# Patient Record
Sex: FEMALE | Race: OTHER | Employment: UNEMPLOYED | URBAN - METROPOLITAN AREA
[De-identification: names, ages, dates, MRNs, and addresses within clinical notes are randomized per-mention and may not be internally consistent; named-entity substitution may affect disease eponyms.]

---

## 2017-05-23 ENCOUNTER — TELEPHONE (OUTPATIENT)
Dept: OBGYN CLINIC | Facility: CLINIC | Age: 37
End: 2017-05-23

## 2017-05-25 ENCOUNTER — TELEPHONE (OUTPATIENT)
Dept: OBGYN CLINIC | Facility: CLINIC | Age: 37
End: 2017-05-25

## 2017-05-25 ENCOUNTER — APPOINTMENT (OUTPATIENT)
Dept: LAB | Age: 37
End: 2017-05-25
Attending: OBSTETRICS & GYNECOLOGY
Payer: COMMERCIAL

## 2017-05-25 ENCOUNTER — OFFICE VISIT (OUTPATIENT)
Dept: OBGYN CLINIC | Facility: CLINIC | Age: 37
End: 2017-05-25

## 2017-05-25 VITALS
WEIGHT: 163.81 LBS | DIASTOLIC BLOOD PRESSURE: 68 MMHG | SYSTOLIC BLOOD PRESSURE: 103 MMHG | HEART RATE: 75 BPM | BODY MASS INDEX: 26 KG/M2

## 2017-05-25 DIAGNOSIS — R30.0 DYSURIA: ICD-10-CM

## 2017-05-25 DIAGNOSIS — R10.2 PELVIC PAIN IN FEMALE: ICD-10-CM

## 2017-05-25 DIAGNOSIS — R10.2 PELVIC PAIN IN FEMALE: Primary | ICD-10-CM

## 2017-05-25 PROCEDURE — 99213 OFFICE O/P EST LOW 20 MIN: CPT | Performed by: OBSTETRICS & GYNECOLOGY

## 2017-05-25 PROCEDURE — 81001 URINALYSIS AUTO W/SCOPE: CPT

## 2017-05-25 RX ORDER — MELATONIN
325
COMMUNITY
End: 2017-10-13

## 2017-05-25 RX ORDER — NITROFURANTOIN 25; 75 MG/1; MG/1
100 CAPSULE ORAL 2 TIMES DAILY
Qty: 14 CAPSULE | Refills: 0 | Status: SHIPPED | OUTPATIENT
Start: 2017-05-25 | End: 2017-06-01

## 2017-05-25 RX ORDER — ERGOCALCIFEROL 1.25 MG/1
CAPSULE ORAL
COMMUNITY
End: 2017-09-11

## 2017-05-25 NOTE — PROGRESS NOTES
HPI:    Patient ID: Rosas Reynolds is a 40year old female. HPI   with reg menses and no BC. They are not using BC but are not planning timed IC either. She had menses 1 week late in April and then 1 week early on May 13th.  She had onset mild supra indication for imaging at this time. We also discussed fertility and reviewed the genetics testing and consult from [facility]- Dr Deion Dillon. 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  I did le

## 2017-05-25 NOTE — TELEPHONE ENCOUNTER
Can RN check for UA result later today and send to Dr on call? It's not back by time I'm leaving here. Thanks.

## 2017-05-25 NOTE — TELEPHONE ENCOUNTER
Please send Macrobid 100mg BID for 7 day course.  14 tabs and no refills for presumed UTI based on UA

## 2017-07-24 ENCOUNTER — TELEPHONE (OUTPATIENT)
Dept: OBGYN CLINIC | Facility: CLINIC | Age: 37
End: 2017-07-24

## 2017-07-24 NOTE — TELEPHONE ENCOUNTER
Pt is having some fatigue and nausea & is calling back about mesg below. Also questions about pn vititams no dha in them. [phone].

## 2017-07-24 NOTE — TELEPHONE ENCOUNTER
Pt had a home positive. It was positive 7/23/17. LMP 6/15/17, exact date. Instructed pt to start a PNV with DHA. Pt agrees to see all OB MDs during her pregnancy. Informed pt to call with any spotting/bleeding or problems.   Pt scheduled for an OBN vis

## 2017-07-25 NOTE — TELEPHONE ENCOUNTER
C/O PT HAS BEEN MIGUEL IRON SUPPLEMENT FOR 6 MONTHS. STATES FERRITIN LEVEL IS LOW. HAD BEEN AS LOW AS 10 IN THE WINTER. LAST DRAW A MONTH AGO IS 31 BUT STATES IT SHOULD BE 70.  PT TAKES VITRON-C (OTC) IRON SUPPLEMENT WITH VIT C.   C/O FATIGUE AND SOB BECAUS

## 2017-07-26 ENCOUNTER — TELEPHONE (OUTPATIENT)
Dept: OBGYN CLINIC | Facility: CLINIC | Age: 37
End: 2017-07-26

## 2017-07-26 NOTE — TELEPHONE ENCOUNTER
Would like to know what she can take for a headache, how much and what can she take. Feels like shes coming down with a cold. Okay to leave detailed vm as to what to take and how much.

## 2017-07-26 NOTE — TELEPHONE ENCOUNTER
Pt states that she feels like she is getting a cold. Pt states that she thinks that she has a fever. Informed pt that if she has a fever or congestion, yellow/green mucous, she needs to see her pcp.  Informed pt for OTC meds it is not rec to have OTC meds

## 2017-07-28 ENCOUNTER — TELEPHONE (OUTPATIENT)
Dept: OBGYN CLINIC | Facility: CLINIC | Age: 37
End: 2017-07-28

## 2017-07-28 DIAGNOSIS — O20.9 BLEEDING IN EARLY PREGNANCY: Primary | ICD-10-CM

## 2017-07-28 NOTE — TELEPHONE ENCOUNTER
Pt is coming in on 8/9 for OBN. But did have positive pregnancy test. Is bleeding bright red right now. Unsure what she should do.

## 2017-07-28 NOTE — TELEPHONE ENCOUNTER
Pt about 6 weeks pregnant based off LMP of 6/15/17 and calling to report vaginal bleeding. Pt stated that the bleeding is bright red in color and started this morning.  Pt stated that she noticed \"blood clots coming out\" when she went to the bathroom this

## 2017-07-28 NOTE — TELEPHONE ENCOUNTER
Reviewed message with Harjinder Don on call for clarification. Harjinder Don stated that pt needs a blood type and quants x2---NOT type and screen. Orders placed and pt informed of recs. Pt stated she will go to lab tomorrow and repeat in 48 hours & call for results.  Pt instr

## 2017-07-29 ENCOUNTER — APPOINTMENT (OUTPATIENT)
Dept: LAB | Facility: HOSPITAL | Age: 37
End: 2017-07-29
Attending: OBSTETRICS & GYNECOLOGY
Payer: COMMERCIAL

## 2017-07-29 DIAGNOSIS — O20.9 BLEEDING IN EARLY PREGNANCY: ICD-10-CM

## 2017-07-29 LAB
B-HCG SERPL-ACNC: 9227 MIU/ML
RH BLOOD TYPE: POSITIVE

## 2017-07-29 PROCEDURE — 86901 BLOOD TYPING SEROLOGIC RH(D): CPT

## 2017-07-29 PROCEDURE — 84702 CHORIONIC GONADOTROPIN TEST: CPT

## 2017-07-29 PROCEDURE — 36415 COLL VENOUS BLD VENIPUNCTURE: CPT

## 2017-07-29 PROCEDURE — 86900 BLOOD TYPING SEROLOGIC ABO: CPT

## 2017-07-31 ENCOUNTER — TELEPHONE (OUTPATIENT)
Dept: PEDIATRICS CLINIC | Facility: CLINIC | Age: 37
End: 2017-07-31

## 2017-07-31 DIAGNOSIS — O20.0 THREATENED ABORTION: Primary | ICD-10-CM

## 2017-07-31 NOTE — TELEPHONE ENCOUNTER
Pt calling that she she has mild cramping. Pt states that on Friday she stared with red bleeding like a period with wiping and spotting. Pt states that Saturday she soaked a pad 8-11 am (an overnight pad.), then the rest of the day no bleeding.   Sunday sh

## 2017-07-31 NOTE — TELEPHONE ENCOUNTER
Pt states she just found out she is pregnant. Pt states she was bleeding on 7/28 and now pt is cramping and has high temperature. Wants to know what should she do next.

## 2017-07-31 NOTE — TELEPHONE ENCOUNTER
Informed pt that I discussed with JEANNETTE and he wanted her to have an OB US and go for a progesterone level. Gave pt the phone number to schedule the OB U/S and informed pt that she would go to the lab to have the progesterone level.  Pt informed of hcg level

## 2017-08-01 ENCOUNTER — HOSPITAL ENCOUNTER (OUTPATIENT)
Dept: ULTRASOUND IMAGING | Facility: HOSPITAL | Age: 37
Discharge: HOME | End: 2017-08-01
Attending: OBSTETRICS & GYNECOLOGY
Payer: COMMERCIAL

## 2017-08-01 DIAGNOSIS — O20.0 THREATENED ABORTION: ICD-10-CM

## 2017-08-01 PROCEDURE — 76817 TRANSVAGINAL US OBSTETRIC: CPT | Performed by: OBSTETRICS & GYNECOLOGY

## 2017-08-01 PROCEDURE — 76801 OB US < 14 WKS SINGLE FETUS: CPT | Performed by: OBSTETRICS & GYNECOLOGY

## 2017-08-01 NOTE — TELEPHONE ENCOUNTER
Pt states that she went for US today and is requesting results. Pt is just leaving appt now. Pt advised that when radiologist dictates report, we will contact her with results.  Pt advised we can check to see if it has been resulted before the office closes

## 2017-08-01 NOTE — TELEPHONE ENCOUNTER
Per NJG pt to repeat US in about 10 days. Pt also advised to repeat HCG and have progesterone level drawn. Pt informed that US shows possible early sax, will need HCG results and repeat US to see if pregnancy is progressing as expected.  Pt informed that if

## 2017-08-01 NOTE — TELEPHONE ENCOUNTER
Per pt her reports have to be sent over, no report is showing in system, Pt would like to know who reads this the radiology dept or lab? Pt is req  Nurse call back would like to dicuss info.   #  [phone]

## 2017-08-02 ENCOUNTER — APPOINTMENT (OUTPATIENT)
Dept: LAB | Facility: HOSPITAL | Age: 37
End: 2017-08-02
Attending: OBSTETRICS & GYNECOLOGY
Payer: COMMERCIAL

## 2017-08-02 ENCOUNTER — TELEPHONE (OUTPATIENT)
Dept: OBGYN CLINIC | Facility: CLINIC | Age: 37
End: 2017-08-02

## 2017-08-02 DIAGNOSIS — O20.0 THREATENED ABORTION, ANTEPARTUM: Primary | ICD-10-CM

## 2017-08-02 DIAGNOSIS — O20.0 THREATENED ABORTION: ICD-10-CM

## 2017-08-02 DIAGNOSIS — O20.0 THREATENED ABORTION, ANTEPARTUM: ICD-10-CM

## 2017-08-02 LAB
B-HCG SERPL-ACNC: NORMAL MIU/ML
PROGEST SERPL-MCNC: 10.5 NG/ML

## 2017-08-02 PROCEDURE — 36415 COLL VENOUS BLD VENIPUNCTURE: CPT

## 2017-08-02 PROCEDURE — 84702 CHORIONIC GONADOTROPIN TEST: CPT

## 2017-08-02 PROCEDURE — 84144 ASSAY OF PROGESTERONE: CPT

## 2017-08-02 NOTE — TELEPHONE ENCOUNTER
I spoke with Jaime Arredondo concerning results from last evening. She actually drove home last evening and forgot to do the blood draw for second HCG and Prog. She is coming today. I'll check tonight and again in AM for results.  We most likely will want next US 10-1

## 2017-08-03 ENCOUNTER — TELEPHONE (OUTPATIENT)
Dept: PEDIATRICS CLINIC | Facility: CLINIC | Age: 37
End: 2017-08-03

## 2017-08-03 NOTE — TELEPHONE ENCOUNTER
HCG noted at 14,333. No answer and I left message with result. Still waiting on Prog level. I want her to schedule repeat US 10-11 days from yesterday's [address].

## 2017-08-03 NOTE — TELEPHONE ENCOUNTER
Pt is calling has some question on her labs .  Pt was given results today but has some more questions ,

## 2017-08-03 NOTE — TELEPHONE ENCOUNTER
I discussed Prog level = 10.6. Level > 10 is OK for spontaneous pregnancy. She will schedule the second US as instructed 08-11 or 08-12. I will be out of town and I asked her to call back on next office day for result.  She does have the known miscarriage r

## 2017-08-03 NOTE — TELEPHONE ENCOUNTER
Pt has more questions for JEANNETTE and would like a call back from JEANNETTE. Pt asking if 10.5 progesterone level is normal. Re-read note from JEANNETTE stating that \"level >10 is OK for spontaneous pregnancy\".  Pt stated that she read online that \"cramping and bleeding

## 2017-08-04 NOTE — TELEPHONE ENCOUNTER
I spoke with Dustin Santiago and discussed normal Prog level. She does not have urinary complaints suspicious for UTI. Frequency is normal in pregnancy. Her bleeding is old blood. I gave instructions. She has US scheduled Friday next week.

## 2017-08-11 ENCOUNTER — HOSPITAL ENCOUNTER (OUTPATIENT)
Dept: ULTRASOUND IMAGING | Facility: HOSPITAL | Age: 37
Discharge: HOME | End: 2017-08-11
Attending: OBSTETRICS & GYNECOLOGY
Payer: COMMERCIAL

## 2017-08-11 DIAGNOSIS — O20.0 THREATENED ABORTION: ICD-10-CM

## 2017-08-11 PROCEDURE — 76801 OB US < 14 WKS SINGLE FETUS: CPT | Performed by: OBSTETRICS & GYNECOLOGY

## 2017-08-11 PROCEDURE — 76817 TRANSVAGINAL US OBSTETRIC: CPT | Performed by: OBSTETRICS & GYNECOLOGY

## 2017-08-14 ENCOUNTER — TELEPHONE (OUTPATIENT)
Dept: OBGYN CLINIC | Facility: CLINIC | Age: 37
End: 2017-08-14

## 2017-08-14 NOTE — TELEPHONE ENCOUNTER
Pt. Would like to get the results of her Ultrasound results from 8/11. Pt. Would like for the Dr to call her back.

## 2017-08-14 NOTE — TELEPHONE ENCOUNTER
Returned phone call to review US results. Pt states she wants to know if JEANNETTE is in today because she was wanting to talk to him and expecting a phone call from him.  Let patient know that he was out of the office and that I am on call and returning her phon

## 2017-08-14 NOTE — TELEPHONE ENCOUNTER
Pt. wants to schedule PN visit, but pt. has not had a Nurse Ed appt. ? Pt. States that she had to get an Ultrasound test, and was told to schedule a PN visit? Please advise, no Nurse Ed appt. ?

## 2017-08-14 NOTE — TELEPHONE ENCOUNTER
PT MISCARRIED A YEAR AGO BUT DID NOT HAVE OBN APPT THEN. PT WAS HOPING FOR OBN APPT THIS WEEK BUT COULD NOT ACCEPT THE OPENINGS WE HAVE.   STATES SHE HAS A VERY DEFINITE TIME FRAME WHEN SHE IS NOT SICK SO DOES NOT WANT AN APPT BEFORE 2PM.  THERE IS NO APPT

## 2017-08-14 NOTE — TELEPHONE ENCOUNTER
Pt only wants to speak with an MD. Routed to [facility] on call in JEANNETTE's absence to please call pt to discuss US results. Thanks.

## 2017-08-17 ENCOUNTER — TELEPHONE (OUTPATIENT)
Dept: OBGYN CLINIC | Facility: CLINIC | Age: 37
End: 2017-08-17

## 2017-08-17 RX ORDER — DOXYLAMINE SUCCINATE AND PYRIDOXINE HYDROCHLORIDE, DELAYED RELEASE TABLETS 10 MG/10 MG 10; 10 MG/1; MG/1
2 TABLET, DELAYED RELEASE ORAL NIGHTLY
Qty: 120 TABLET | Refills: 0 | Status: SHIPPED | OUTPATIENT
Start: 2017-08-17 | End: 2017-10-14

## 2017-08-17 NOTE — TELEPHONE ENCOUNTER
Pt c/o nausea and vomiting. Pt states that she can't eat anything, has no appetite. Pt states that she vomited this morning. This week pt states symptoms have gotten worse. Pt typically vomits once daily. Pt states that she can tolerate water.  Pt states th

## 2017-08-17 NOTE — TELEPHONE ENCOUNTER
Discussed with JEANNETTE on call. Pt to try diclegis. Pt given instructions on use. Pt advised it may cause drowsiness. Pt to call with any further questions or concerns. Pt verbalizes understanding.

## 2017-08-21 ENCOUNTER — TELEPHONE (OUTPATIENT)
Dept: OBGYN CLINIC | Facility: CLINIC | Age: 37
End: 2017-08-21

## 2017-08-21 NOTE — TELEPHONE ENCOUNTER
Pt did not show up for her OBN appt at 1pm today. I called pt. States she was too sick to come to appt. Pt is c/o nausea and vomiting. Pt was given diclegis by JEANNETTE on 8/17.   Pt states she is taking 2 pills at night and it helped for the first 2 days, b

## 2017-08-29 ENCOUNTER — TELEPHONE (OUTPATIENT)
Dept: OBGYN CLINIC | Facility: CLINIC | Age: 37
End: 2017-08-29

## 2017-08-29 ENCOUNTER — NURSE ONLY (OUTPATIENT)
Dept: OBGYN CLINIC | Facility: CLINIC | Age: 37
End: 2017-08-29

## 2017-08-29 DIAGNOSIS — Z34.91 ENCOUNTER FOR SUPERVISION OF NORMAL PREGNANCY IN FIRST TRIMESTER, UNSPECIFIED GRAVIDITY: Primary | ICD-10-CM

## 2017-08-29 DIAGNOSIS — Z34.01 ENCOUNTER FOR SUPERVISION OF NORMAL FIRST PREGNANCY IN FIRST TRIMESTER: Primary | ICD-10-CM

## 2017-08-29 RX ORDER — CHOLECALCIFEROL (VITAMIN D3) 25 MCG
1 TABLET,CHEWABLE ORAL DAILY
COMMUNITY
End: 2018-02-15

## 2017-08-29 NOTE — TELEPHONE ENCOUNTER
Pt seen for OBN and would like FTS. Pt lives in [place] and would prefer to see an MFM practice closer to home. [facility] or [facility] if possible.

## 2017-08-29 NOTE — PROGRESS NOTES
Pt seen for OBN appt today c/o nausea. Pt states she takes Diclegis nightly and it helps. Pt reports occasional vomiting but it otherwise able to tolerate food and fluid intake is sufficient.  Pt states she just a had labs drawn at her PCP's office 2 weeks and or partner Yes [facility] March 2017 with    Pets Yes Cat        GENETICS SCREENING    Patient greater than 35 Yes    Canavan Disease  No    Cystic Fibrosis No    Down Syndrome No    Hemophilia No    Masterson's Chorea No    Mental Retardation/Autis

## 2017-08-29 NOTE — TELEPHONE ENCOUNTER
Pt seen today for OBN. Pt asking if it's safe to have laser hair removal on her face during pregnancy. Pt states she noticed a lot of new hair since becoming pregnant. Informed her this would be sent to JEANNETTE on call and we will call her with his recs.

## 2017-08-30 ENCOUNTER — TELEPHONE (OUTPATIENT)
Dept: OBGYN CLINIC | Facility: CLINIC | Age: 37
End: 2017-08-30

## 2017-08-30 DIAGNOSIS — Z3A.09 9 WEEKS GESTATION OF PREGNANCY: Primary | ICD-10-CM

## 2017-08-30 LAB
HCT: 39.3
HEMATOCRIT: 39.3 %
HEMATOCRIT: 39.3 %
HEMOGLOBIN: 13.6 G/DL (ref 12–15)
HGB: 13.6 G/DL
HGB: 13.6 G/DL
MCV: 87 FL
PLATELETS: 279 X10E3/UL
T3 FREE: 2.8
T4, FREE: 0.9
TSH: 1.75 UIU/ML
WBC: 12.1 /HPF

## 2017-08-30 NOTE — TELEPHONE ENCOUNTER
Pt is 9w5d and requesting calcium supplements. Pt states she had OBN yesterday and the nurse discussed the recommended calcium during pregnancy and pt does not think she is consuming enough calcium.  Pt has PN appt scheduled with JEANNETTE next week on 9/7/17 and

## 2017-08-30 NOTE — TELEPHONE ENCOUNTER
Patient's info route to [facility]'s Dr. Maxi Tafoya to [phone]. Patient informed and given scheduling info [phone].

## 2017-08-30 NOTE — TELEPHONE ENCOUNTER
RECEIVED CBC, CMP TSH, T3, T4, UA AND URINE CULTURE FROM PT'S PCP, DR. Gerald Brunner FROM [address]. LABS ENTERED IN RESULTS CONSOLE. ORDER FOR REMAINING NPN LABS PLACED.

## 2017-09-02 ENCOUNTER — TELEPHONE (OUTPATIENT)
Dept: PEDIATRICS CLINIC | Facility: CLINIC | Age: 37
End: 2017-09-02

## 2017-09-02 RX ORDER — ONDANSETRON 4 MG/1
4 TABLET, FILM COATED ORAL EVERY 8 HOURS PRN
Qty: 30 TABLET | Refills: 1 | Status: SHIPPED | OUTPATIENT
Start: 2017-09-02 | End: 2017-09-23

## 2017-09-02 NOTE — TELEPHONE ENCOUNTER
Pt states the nausea meds prescribed are not helping. States they have made her throat up. Wants to know if there Is something else that can be prescribed .

## 2017-09-02 NOTE — TELEPHONE ENCOUNTER
Pt was given diclegis and taking the meds have made her throw up. Pt states she is nauseous all day. Pt states she can't even go into the kitchen because of the smells.   Pt states nausea is getting worse, but she is able to keep a little food and drink d

## 2017-09-02 NOTE — TELEPHONE ENCOUNTER
Per JEANNETTE on call, give pt rx for zofran 4mg, take one tab by mouth every 8 hours prn, #30 with 1 refill. Pt informed. Pt states she read that a side effect of zofran is nausea. Pt reassured that this is a med to help with nausea. Erx sent.

## 2017-09-07 ENCOUNTER — TELEPHONE (OUTPATIENT)
Dept: OBGYN CLINIC | Facility: CLINIC | Age: 37
End: 2017-09-07

## 2017-09-07 ENCOUNTER — INITIAL PRENATAL (OUTPATIENT)
Dept: OBGYN CLINIC | Facility: CLINIC | Age: 37
End: 2017-09-07

## 2017-09-07 ENCOUNTER — APPOINTMENT (OUTPATIENT)
Dept: LAB | Age: 37
End: 2017-09-07
Attending: OBSTETRICS & GYNECOLOGY
Payer: COMMERCIAL

## 2017-09-07 VITALS
DIASTOLIC BLOOD PRESSURE: 68 MMHG | BODY MASS INDEX: 24 KG/M2 | HEART RATE: 79 BPM | WEIGHT: 156 LBS | SYSTOLIC BLOOD PRESSURE: 102 MMHG

## 2017-09-07 DIAGNOSIS — Z34.01 ENCOUNTER FOR SUPERVISION OF NORMAL FIRST PREGNANCY IN FIRST TRIMESTER: Primary | ICD-10-CM

## 2017-09-07 DIAGNOSIS — Z3A.09 9 WEEKS GESTATION OF PREGNANCY: ICD-10-CM

## 2017-09-07 PROBLEM — O09.519 AMA (ADVANCED MATERNAL AGE) PRIMIGRAVIDA 35+: Status: ACTIVE | Noted: 2017-09-07

## 2017-09-07 PROBLEM — Q95.1: Status: ACTIVE | Noted: 2017-09-07

## 2017-09-07 LAB
ANTIBODY SCREEN: NEGATIVE
APPEARANCE: CLEAR
GLUCOSE (URINE DIPSTICK): 100 MG/DL
MULTISTIX LOT#: NORMAL NUMERIC
RH BLOOD TYPE: POSITIVE
RUBV IGG SER-ACNC: 103.1 IU/ML
URINE-COLOR: YELLOW

## 2017-09-07 PROCEDURE — 86900 BLOOD TYPING SEROLOGIC ABO: CPT

## 2017-09-07 PROCEDURE — 86901 BLOOD TYPING SEROLOGIC RH(D): CPT

## 2017-09-07 PROCEDURE — 86780 TREPONEMA PALLIDUM: CPT

## 2017-09-07 PROCEDURE — 86850 RBC ANTIBODY SCREEN: CPT

## 2017-09-07 PROCEDURE — 87389 HIV-1 AG W/HIV-1&-2 AB AG IA: CPT

## 2017-09-07 PROCEDURE — 87340 HEPATITIS B SURFACE AG IA: CPT

## 2017-09-07 PROCEDURE — 36415 COLL VENOUS BLD VENIPUNCTURE: CPT

## 2017-09-07 PROCEDURE — 86762 RUBELLA ANTIBODY: CPT

## 2017-09-07 NOTE — PROGRESS NOTES
We discussed AMA as well as her known chromosomal inversion. She will see Dr Hans Keys at [facility] [place] due to location and she also had genetics consult there in November. Daily nausea and emesis with fatigue on meds. Using Diclegis with help.  She asked several time

## 2017-09-07 NOTE — TELEPHONE ENCOUNTER
I discussed the previous message with the patient but please reinforce that she needs to have appointment before 13 6/7 weeks. She's 10 6/7 today. Her serology labs were just drawn today at [facility] and may not be resulted until Saturday.

## 2017-09-07 NOTE — TELEPHONE ENCOUNTER
I have completed the prenatal exam, problem list and info on her prenatal chart. She needs to see the [facility] group for consult and first trimester screen due to Lanney Maher and Chromosome inversion. Looks like the referral is already done.   Please se

## 2017-09-08 LAB
C TRACH DNA SPEC QL NAA+PROBE: NEGATIVE
HBV SURFACE AG SERPL QL IA: NONREACTIVE
HIV1+2 AB SERPL QL IA: NONREACTIVE
HPV I/H RISK 1 DNA SPEC QL NAA+PROBE: NEGATIVE
N GONORRHOEA DNA SPEC QL NAA+PROBE: NEGATIVE
T PALLIDUM AB SER QL: NEGATIVE
T VAGINALIS RRNA SPEC QL NAA+PROBE: NEGATIVE

## 2017-09-11 ENCOUNTER — TELEPHONE (OUTPATIENT)
Dept: OBGYN CLINIC | Facility: CLINIC | Age: 37
End: 2017-09-11

## 2017-09-11 ENCOUNTER — OFFICE VISIT (OUTPATIENT)
Dept: OBGYN CLINIC | Facility: CLINIC | Age: 37
End: 2017-09-11

## 2017-09-11 VITALS — BODY MASS INDEX: 23 KG/M2 | WEIGHT: 149 LBS

## 2017-09-11 DIAGNOSIS — O20.0 THREATENED ABORTION, ANTEPARTUM: Primary | ICD-10-CM

## 2017-09-11 LAB — LAST PAP RESULT: NORMAL

## 2017-09-11 PROCEDURE — 99213 OFFICE O/P EST LOW 20 MIN: CPT | Performed by: OBSTETRICS & GYNECOLOGY

## 2017-09-11 NOTE — TELEPHONE ENCOUNTER
Informed of below recs. \[facility]\"" already spoke with Michelle this AM and was told they will call her back after receiving paperwork. Pt also wondering about Zantac for acid reflux that was advised by JEANNETTE at PN today. \[facility]\"" was advised to take twice a day.

## 2017-09-11 NOTE — TELEPHONE ENCOUNTER
I know patient has referral generated for the FTS at [facility] but we need to send updated copy of prenatal since all of her labs are back from 09-07 draw. Thanks.

## 2017-09-11 NOTE — TELEPHONE ENCOUNTER
Pt 11w3d calling to report a \"sour stomach\" and vomiting over the weekend. Pt also calling to report vaginal bleeding last night. Pt stated that since Friday she has thrown up daily and \"hasnt been eating a proper dinner\".  Pt stated that last night for

## 2017-09-11 NOTE — TELEPHONE ENCOUNTER
LMTCB. REQUEST AND PN INFO HAVE BEEN FAXED TO [facility]. PT NEEDS TO CALL THEM [phone].

## 2017-09-11 NOTE — PROGRESS NOTES
Problem Visit: Recurrent bleeding last evening and questionable passing tissue. Spec shows small amount of dark red blood and no active bleeding. Bimanual gives os closed / thick / firm and uterus non-tender. Office US shows viable IUP with FM and FHT's.  R

## 2017-09-12 NOTE — TELEPHONE ENCOUNTER
Make sure taking diclegis correctly -- 2 at bedtime then add one in morning if needed & add another in after noon if still needed.  Yes can take zantac & diclegis at same time

## 2017-09-12 NOTE — TELEPHONE ENCOUNTER
REVIEWED INSTRUCTIONS FOR DICLEGIS AND ASSURED HER SHE CAN TAKE THAT ALONG WITH THE ZANTAC. SHE IS ABLE TO KEEP MOST FLUIDS DOWN BUT NOT EATING MUCH IN THE PAST 5-6 DAYS.   TALKED ABOUT REALLY BLAND FOODS SUCH AS PLAIN RICE AND PLAIN MASHED POTATOES, FALGUNI

## 2017-09-13 ENCOUNTER — TELEPHONE (OUTPATIENT)
Dept: OBGYN CLINIC | Facility: CLINIC | Age: 37
End: 2017-09-13

## 2017-09-13 NOTE — TELEPHONE ENCOUNTER
CONOR IS FROM [facility] AND WANTED TO KNOW IF THEY SHOULD GO FORWARD WITH THE APPT BECAUSE NOTES INDICATE PT WAS BLEEDING.   EXPLAINED THAT 9-11-17 APPT NOTES INDICATE PT HAD SOME BLEEDING BUT US IN OFFICE SHOWED VIABLE IUP SO SHE WILL GO FORWARD

## 2017-09-14 ENCOUNTER — TELEPHONE (OUTPATIENT)
Dept: OBGYN CLINIC | Facility: CLINIC | Age: 37
End: 2017-09-14

## 2017-09-14 NOTE — TELEPHONE ENCOUNTER
PT Amy 49 UNSURE WHAT THEY ARE SUPPOSED TO BE SCHEDULING. AFTER REVIEWING CHART THERE IS ANOTHER MESSAGE FROM 9-7-17, AS WELL AS PHONE ENCOUNTER FROM 9-11-17 FROM JEANNETTE INDICATING PT NEEDS FTS AND A CONSULT FOR CHROMOSOME INVERSION.    I

## 2017-09-18 ENCOUNTER — CHARTING TRANS (OUTPATIENT)
Dept: OTHER | Age: 37
End: 2017-09-18

## 2017-09-19 ENCOUNTER — TELEPHONE (OUTPATIENT)
Dept: OBGYN CLINIC | Facility: CLINIC | Age: 37
End: 2017-09-19

## 2017-09-19 NOTE — TELEPHONE ENCOUNTER
Received [facility] MF Letter dated 9/18/17 and placed on JEANNETTE's desk.

## 2017-09-19 NOTE — TELEPHONE ENCOUNTER
Received US report from [facility] dated 9/18/17. Placed in JEANNETTE orange folder; copy placed in nurses brown folder.   Thank you~

## 2017-09-21 NOTE — TELEPHONE ENCOUNTER
JEANNETTE signed on MFM letter from [address] dated 9/18/17. Sent to scanning.

## 2017-09-23 ENCOUNTER — TELEPHONE (OUTPATIENT)
Dept: OBGYN CLINIC | Facility: CLINIC | Age: 37
End: 2017-09-23

## 2017-09-23 RX ORDER — ONDANSETRON 4 MG/1
4 TABLET, ORALLY DISINTEGRATING ORAL EVERY 8 HOURS PRN
Qty: 30 TABLET | Refills: 1 | Status: SHIPPED | OUTPATIENT
Start: 2017-09-23 | End: 2018-02-15

## 2017-09-23 NOTE — TELEPHONE ENCOUNTER
Pt requesting that her Zofran 4 mg oral tab rx is changed to Zofran that dissolves in her mouth. Pt states she never used the Zofran because that was her last resort. Pt states she vomited the water that she was sipping.  Advised pt if she cannot tolerate s

## 2017-09-23 NOTE — TELEPHONE ENCOUNTER
13w1d c/o vomiting 4 times last night. States has been taking Diclegis 4 per day. States due to \"feeling better\" last time she took it was Wednesday. States Friday afternoon started vomiting and was not able to keep down any crackers or water.   States

## 2017-09-23 NOTE — TELEPHONE ENCOUNTER
Pt advised to take diclegis and to continue taking up to 4 times per day as directed. Pt is keeping water down now. Pt advised to focus on keeping fluids down now and later on she can try some foods.  Pt to go to nearest ER if unable to keep food or fluids

## 2017-09-28 ENCOUNTER — TELEPHONE (OUTPATIENT)
Dept: OBGYN CLINIC | Facility: CLINIC | Age: 37
End: 2017-09-28

## 2017-09-28 NOTE — TELEPHONE ENCOUNTER
Received lab report from [facility] dated 9/18/17. Placed in JEANNETTE orange folder; copy placed in nurses brown folder.   Thank you~

## 2017-10-02 ENCOUNTER — ROUTINE PRENATAL (OUTPATIENT)
Dept: OBGYN CLINIC | Facility: CLINIC | Age: 37
End: 2017-10-02

## 2017-10-02 VITALS
SYSTOLIC BLOOD PRESSURE: 103 MMHG | BODY MASS INDEX: 23 KG/M2 | DIASTOLIC BLOOD PRESSURE: 73 MMHG | WEIGHT: 149.38 LBS | HEART RATE: 85 BPM

## 2017-10-02 DIAGNOSIS — Z34.92 ENCOUNTER FOR SUPERVISION OF NORMAL PREGNANCY IN SECOND TRIMESTER, UNSPECIFIED GRAVIDITY: Primary | ICD-10-CM

## 2017-10-02 RX ORDER — ACETAMINOPHEN 500 MG
TABLET ORAL
Refills: 11 | COMMUNITY
Start: 2017-07-31 | End: 2017-10-13

## 2017-10-04 NOTE — PROGRESS NOTES
Negative Panorama with Etelvina Henley. Does not want to know gender. Daily nausea and emesis using Diclegis and rare Zofran. Discussed Level 2 f/u with Etelvina Henley M.

## 2017-10-06 ENCOUNTER — TELEPHONE (OUTPATIENT)
Dept: OBGYN CLINIC | Facility: CLINIC | Age: 37
End: 2017-10-06

## 2017-10-06 NOTE — TELEPHONE ENCOUNTER
Pt 15w0d calling to report that she experienced 3 episodes of \"abrupt vomiting\" last night.  Pt stated that she took Diclegis around 10:30pm and then started vomiting around 11pm. Pt stated that she had another episode of vomiting about 30 minutes later a

## 2017-10-13 ENCOUNTER — ROUTINE PRENATAL (OUTPATIENT)
Dept: OBGYN CLINIC | Facility: CLINIC | Age: 37
End: 2017-10-13

## 2017-10-13 ENCOUNTER — HOSPITAL ENCOUNTER (OUTPATIENT)
Facility: HOSPITAL | Age: 37
Setting detail: OBSERVATION
Discharge: HOME | End: 2017-10-13
Attending: OBSTETRICS & GYNECOLOGY | Admitting: OBSTETRICS & GYNECOLOGY
Payer: COMMERCIAL

## 2017-10-13 ENCOUNTER — TELEPHONE (OUTPATIENT)
Dept: OBGYN CLINIC | Facility: CLINIC | Age: 37
End: 2017-10-13

## 2017-10-13 ENCOUNTER — APPOINTMENT (OUTPATIENT)
Dept: PERINATAL CARE | Facility: HOSPITAL | Age: 37
End: 2017-10-13
Attending: OBSTETRICS & GYNECOLOGY
Payer: COMMERCIAL

## 2017-10-13 VITALS
BODY MASS INDEX: 23 KG/M2 | HEART RATE: 105 BPM | SYSTOLIC BLOOD PRESSURE: 148 MMHG | WEIGHT: 147 LBS | DIASTOLIC BLOOD PRESSURE: 76 MMHG

## 2017-10-13 VITALS — TEMPERATURE: 99 F | RESPIRATION RATE: 18 BRPM

## 2017-10-13 DIAGNOSIS — O09.512 ELDERLY PRIMIGRAVIDA IN SECOND TRIMESTER: Primary | ICD-10-CM

## 2017-10-13 DIAGNOSIS — O41.90X0: ICD-10-CM

## 2017-10-13 DIAGNOSIS — O46.90 VAGINAL BLEEDING IN PREGNANCY: ICD-10-CM

## 2017-10-13 DIAGNOSIS — Z34.92 ENCOUNTER FOR SUPERVISION OF NORMAL PREGNANCY IN SECOND TRIMESTER, UNSPECIFIED GRAVIDITY: Primary | ICD-10-CM

## 2017-10-13 PROCEDURE — 76805 OB US >/= 14 WKS SNGL FETUS: CPT | Performed by: OBSTETRICS & GYNECOLOGY

## 2017-10-13 PROCEDURE — 99212 OFFICE O/P EST SF 10 MIN: CPT | Performed by: OBSTETRICS & GYNECOLOGY

## 2017-10-13 PROCEDURE — 99213 OFFICE O/P EST LOW 20 MIN: CPT | Performed by: OBSTETRICS & GYNECOLOGY

## 2017-10-13 NOTE — PROGRESS NOTES
Problem visit--16 wks c/o gush of pink watery fluid this morning. States she felt a large gush that woke her up and then kept leaking when she sat on the toilet. This was before she had voided.  She notes brown discharge throughout the day since which has

## 2017-10-13 NOTE — CONSULTS
[facility] Consultation    Date of Admission:  10/13/2017  Date of Consult:  10/13/2017    Reason for Consult:   Suspected ruptured membranes in the second trimester.     History of Present Illness:  Benancio Lesch is Living: Not recorded      Other Relevant History:  Past Medical History:   Diagnosis Date   • Anemia    • Anxiety      Past Surgical History:  5/2012: OTHER SURGICAL HISTORY      Comment: rhinoplasty  2012: REPAIR OF NASAL SEPTUM  No family history on file interruption due to the very poor prognosis in the pregnancy. I explained that if she would like, expectant management, she will need to be vigilant in monitoring for s/sx of infection and agreeable to IOL if signs of infection develop.   I explained the s periventricular leukomalacia and other neurologic sequelae, infection (eg, sepsis, pneumonia, meningitis), and necrotizing enterocolitis. The rates of these morbidities vary with gestational age and are higher in the setting of chorioamnionitis.       PPROM consultation and coordination of care. Our discussion is summarized above. The approximate physician face-to-face time was 40 minutes. I discussed the patient with Daiana Fairbanks and Sharee Kumar.     Paras Membreno MD  10/13/2017  4:26 PM

## 2017-10-13 NOTE — TELEPHONE ENCOUNTER
PT REQUESTING AN REFILL ON DICIGIS 10-10 MGS / PT ALSO STATE SHE'S 16 WEEKS / PT STATE SHE HAD WATER COMING FORM HER VAGINAL AREA / FEELING A LITTLE PRESSURE ON HER VAGINAL AREA / PLS ADV

## 2017-10-13 NOTE — TELEPHONE ENCOUNTER
Pt is 16w0d, reports she felt her panties were \"soaked\" when she woke up this morning. Pt states it had a \"foul smell and was a little bloody\". Pt states she put on a panty liner at 6 am and it filled up with a watery pinkish fluid.  Pt denies abdominal

## 2017-10-13 NOTE — TRIAGE
[facility]      Triage Note    Harjeet Palomino Patient Status:  Observation    1980 MRN [id number]   Location [address]  Attending Cheyenne Dwyer MD   Hosp Day # 0 PCP Nahum Allen Para: G3 this was thought to have more to do with there was no fluid left in vagina. Pt has appt with Dr Villa Daniel on Monday and plan is to get his 2nd opinion and then plan next step.   Pt is aware that due to live fetus and no evidence of infection that we cannot t

## 2017-10-14 ENCOUNTER — TELEPHONE (OUTPATIENT)
Dept: OBGYN CLINIC | Facility: CLINIC | Age: 37
End: 2017-10-14

## 2017-10-14 PROBLEM — O42.912 PREMATURE RUPTURE OF MEMBRANES IN SECOND TRIMESTER: Status: ACTIVE | Noted: 2017-10-14

## 2017-10-14 RX ORDER — DOXYLAMINE SUCCINATE AND PYRIDOXINE HYDROCHLORIDE, DELAYED RELEASE TABLETS 10 MG/10 MG 10; 10 MG/1; MG/1
2 TABLET, DELAYED RELEASE ORAL NIGHTLY
Qty: 120 TABLET | Refills: 0 | Status: SHIPPED | OUTPATIENT
Start: 2017-10-14 | End: 2018-02-15

## 2017-10-14 NOTE — TELEPHONE ENCOUNTER
Paged on call with c/o continuing nausea / emesis. She is out of Diclegis. I refilled med. We discussed her SROM. She denies pain, further LOF, blood or fever. She has appt with Dr Shena Nj with [facility] at about 1100 on Monday 10-16.  Can I as

## 2017-10-14 NOTE — TELEPHONE ENCOUNTER
Pt sent to [facility] triage due to office exam suspicious for SROM at 16 weeks gestation. Pt seen by [facility] MFM and VERONIKA 1.5 suspicious for rupture but amnisure was negative- this was thought to have more to do with there was no fluid left in vagina.   Pt has appt w

## 2017-10-16 ENCOUNTER — CHARTING TRANS (OUTPATIENT)
Dept: OTHER | Age: 37
End: 2017-10-16

## 2017-10-16 ENCOUNTER — TELEPHONE (OUTPATIENT)
Dept: OBGYN CLINIC | Facility: CLINIC | Age: 37
End: 2017-10-16

## 2017-10-16 NOTE — TELEPHONE ENCOUNTER
US report and MFM consult faxed to Dr Greg Andujar at [facility] #[phone] per JEANNETTE's request below.

## 2017-10-16 NOTE — TELEPHONE ENCOUNTER
6wks-hasnt had a bowel movement in 3 days, would like to know if colace is okay to take (stool softner)

## 2017-10-17 ENCOUNTER — OFFICE VISIT (OUTPATIENT)
Dept: OBGYN CLINIC | Facility: CLINIC | Age: 37
End: 2017-10-17

## 2017-10-17 VITALS
HEART RATE: 85 BPM | WEIGHT: 149 LBS | BODY MASS INDEX: 23 KG/M2 | SYSTOLIC BLOOD PRESSURE: 111 MMHG | DIASTOLIC BLOOD PRESSURE: 77 MMHG

## 2017-10-17 DIAGNOSIS — N89.8 VAGINAL DISCHARGE: Primary | ICD-10-CM

## 2017-10-17 PROCEDURE — 99212 OFFICE O/P EST SF 10 MIN: CPT | Performed by: OBSTETRICS & GYNECOLOGY

## 2017-10-17 NOTE — PROGRESS NOTES
Bobbi Madera is a 40year old female [id number] Patient's last menstrual period was 06/15/2017. Patient presents with:  Gyn Problem: vaginal itching/discharge in early pregnancy    Pt is 16 4/7 wk IUP. Was diagnosed with PPROM on 10/13/17.   Seen MFM at [facility]

## 2017-10-18 ENCOUNTER — TELEPHONE (OUTPATIENT)
Dept: OBGYN CLINIC | Facility: CLINIC | Age: 37
End: 2017-10-18

## 2017-10-18 NOTE — TELEPHONE ENCOUNTER
Received report from [facility] dated 10/16/17. Placed in JEANNETTE orange folder; copy placed in nurses brown folder.   Thank you~

## 2017-10-20 ENCOUNTER — TELEPHONE (OUTPATIENT)
Dept: OBGYN CLINIC | Facility: CLINIC | Age: 37
End: 2017-10-20

## 2017-10-20 ENCOUNTER — CHARTING TRANS (OUTPATIENT)
Dept: OTHER | Age: 37
End: 2017-10-20

## 2017-10-20 NOTE — TELEPHONE ENCOUNTER
Pt is 17wk PPROM reporting \"dampness in her panties\" states it's not constant, it comes and goes and is clear and odorless. Informed pt that since there is a tear in her water bag this is likely amniotic fluid.  Her recent vaginal culture was normal. Lizet Childers

## 2017-10-20 NOTE — TELEPHONE ENCOUNTER
Pt calling for vaginal culture results. Results negative, pt states she still has a lot of liquidy discharge. (Pt is PPROM at 17 weeks). Pt states she has no further questions.

## 2017-10-23 ENCOUNTER — HOSPITAL (OUTPATIENT)
Dept: OTHER | Age: 37
End: 2017-10-23
Attending: OBSTETRICS & GYNECOLOGY

## 2017-10-23 LAB
AMORPH SED URNS QL MICRO: ABNORMAL
AMPHETAMINES UR QL SCN>500 NG/ML: NEGATIVE
ANALYZER ANC (IANC): ABNORMAL
APPEARANCE UR: CLEAR
BARBITURATES UR QL SCN>200 NG/ML: NEGATIVE
BASOPHILS # BLD: 0 THOUSAND/MCL (ref 0–0.3)
BASOPHILS NFR BLD: 0 %
BENZODIAZ UR QL SCN>200 NG/ML: NEGATIVE
BILIRUB UR QL: NEGATIVE
BZE UR QL SCN>150 NG/ML: NEGATIVE
CANNABINOIDS UR QL SCN>50 NG/ML: NEGATIVE
CAOX CRY URNS QL MICRO: ABNORMAL
COLOR UR: COLORLESS
CRP SERPL-MCNC: 0.5 MG/DL
DIFFERENTIAL METHOD BLD: ABNORMAL
EOSINOPHIL # BLD: 0.2 THOUSAND/MCL (ref 0.1–0.5)
EOSINOPHIL NFR BLD: 2 %
EPITH CASTS #/AREA URNS LPF: ABNORMAL /[LPF]
ERYTHROCYTE [DISTWIDTH] IN BLOOD: 13.6 % (ref 11–15)
FATTY CASTS #/AREA URNS LPF: ABNORMAL /[LPF]
GLUCOSE UR-MCNC: ABNORMAL MG/DL
GRAN CASTS #/AREA URNS LPF: ABNORMAL /[LPF]
HEMATOCRIT: 35.3 % (ref 36–46.5)
HGB BLD-MCNC: 12.5 GM/DL (ref 12–15.5)
HGB UR QL: NEGATIVE
HYALINE CASTS #/AREA URNS LPF: ABNORMAL /[LPF]
KETONES UR-MCNC: NEGATIVE MG/DL
LEUKOCYTE ESTERASE UR QL STRIP: NEGATIVE
LYMPHOCYTES # BLD: 3.2 THOUSAND/MCL (ref 1–4.8)
LYMPHOCYTES NFR BLD: 21 %
MCH RBC QN AUTO: 29.8 PG (ref 26–34)
MCHC RBC AUTO-ENTMCNC: 35.4 GM/DL (ref 32–36.5)
MCV RBC AUTO: 84.2 FL (ref 78–100)
METHADONE UR QL SCN>300 NG/ML: NEGATIVE NG/ML
MICROSCOPIC (MT): ABNORMAL
MIXED CELL CASTS #/AREA URNS LPF: ABNORMAL /[LPF]
MONOCYTES # BLD: 1 THOUSAND/MCL (ref 0.3–0.9)
MONOCYTES NFR BLD: 6 %
MUCOUS THREADS URNS QL MICRO: ABNORMAL
NEUTROPHILS # BLD: 10.9 THOUSAND/MCL (ref 1.8–7.7)
NEUTROPHILS NFR BLD: 71 %
NEUTS SEG NFR BLD: ABNORMAL %
NITRITE UR QL: NEGATIVE
OPIATES UR QL SCN>300 NG/ML: NEGATIVE
PCP UR QL SCN>25 NG/ML: NEGATIVE
PERCENT NRBC: ABNORMAL
PH UR: 7 UNIT (ref 5–7)
PLATELET # BLD: 261 THOUSAND/MCL (ref 140–450)
PROT UR QL: NEGATIVE MG/DL
RBC # BLD: 4.19 MILLION/MCL (ref 4–5.2)
RBC CASTS #/AREA URNS LPF: ABNORMAL /[LPF]
RENAL EPI CELLS #/AREA URNS HPF: ABNORMAL /[HPF]
SP GR UR: <1.005 (ref 1–1.03)
SPECIMEN SOURCE: ABNORMAL
SPERM URNS QL MICRO: ABNORMAL
T VAGINALIS URNS QL MICRO: ABNORMAL
TRI-PHOS CRY URNS QL MICRO: ABNORMAL
URATE CRY URNS QL MICRO: ABNORMAL
URNS CMNT MICRO: ABNORMAL
UROBILINOGEN UR QL: 0.2 MG/DL (ref 0–1)
WAXY CASTS #/AREA URNS LPF: ABNORMAL /[LPF]
WBC # BLD: 15.3 THOUSAND/MCL (ref 4.2–11)
WBC CASTS #/AREA URNS LPF: ABNORMAL /[LPF]
YEAST HYPHAE URNS QL MICRO: ABNORMAL
YEAST URNS QL MICRO: ABNORMAL

## 2017-10-23 NOTE — TELEPHONE ENCOUNTER
Pt is 17 wks pregnant, states she is having a couple of drops throughout the day in her panties,would like to confirm if it's amniotic fluid. Appt 10/26 w/ fetal med ob. pls adv.

## 2017-10-24 ENCOUNTER — CHARTING TRANS (OUTPATIENT)
Dept: OTHER | Age: 37
End: 2017-10-24

## 2017-10-24 NOTE — TELEPHONE ENCOUNTER
I spoke with oRsa Dinh and discussed continued intermittent fluid leak. She was seen at [facility] ED last evening and had informal US by an OB resident who spoke with [facility] MFM on call. There was reportedly a 2 cm pocket of fluid.  This doesn't change current management

## 2017-10-26 ENCOUNTER — CHARTING TRANS (OUTPATIENT)
Dept: OTHER | Age: 37
End: 2017-10-26

## 2017-10-31 ENCOUNTER — LAB SERVICES (OUTPATIENT)
Dept: OTHER | Age: 37
End: 2017-10-31

## 2017-10-31 ENCOUNTER — CHARTING TRANS (OUTPATIENT)
Dept: OTHER | Age: 37
End: 2017-10-31

## 2017-10-31 LAB
GLUCOSE U: NORMAL
PROTEIN: NORMAL

## 2017-11-02 LAB
APPEARANCE UR: ABNORMAL
BACTERIA #/AREA URNS HPF: ABNORMAL /HPF
BACTERIA UR CULT: NORMAL
BILIRUB UR QL: NEGATIVE
C TRACH RRNA SPEC QL NAA+PROBE: NEGATIVE
COLOR UR: ABNORMAL
GLUCOSE UR-MCNC: 150 MG/DL
HYALINE CASTS #/AREA URNS LPF: ABNORMAL /LPF (ref 0–5)
KETONES UR-MCNC: NEGATIVE MG/DL
N GONORRHOEA RRNA SPEC QL NAA+PROBE: NEGATIVE
NITRITE UR QL: NEGATIVE
PH UR: 7 UNITS (ref 5–7)
PROT UR QL: NEGATIVE MG/DL
RBC #/AREA URNS HPF: ABNORMAL /HPF (ref 0–3)
RBC-URINE: ABNORMAL
SP GR UR: <1.005 (ref 1–1.03)
SPECIMEN SOURCE: ABNORMAL
SPECIMEN SOURCE: NORMAL
SQUAMOUS #/AREA URNS HPF: ABNORMAL /HPF (ref 0–5)
UROBILINOGEN UR QL: 0.2 MG/DL (ref 0–1)
WBC #/AREA URNS HPF: ABNORMAL /HPF (ref 0–5)
WBC-URINE: ABNORMAL

## 2017-11-08 ENCOUNTER — LAB SERVICES (OUTPATIENT)
Dept: OTHER | Age: 37
End: 2017-11-08

## 2017-11-08 ENCOUNTER — CHARTING TRANS (OUTPATIENT)
Dept: OTHER | Age: 37
End: 2017-11-08

## 2017-11-08 LAB
GLUCOSE U: NORMAL
PROTEIN: NORMAL

## 2017-11-16 ENCOUNTER — CHARTING TRANS (OUTPATIENT)
Dept: OTHER | Age: 37
End: 2017-11-16

## 2017-11-21 ENCOUNTER — LAB SERVICES (OUTPATIENT)
Dept: OTHER | Age: 37
End: 2017-11-21

## 2017-11-21 ENCOUNTER — CHARTING TRANS (OUTPATIENT)
Dept: OTHER | Age: 37
End: 2017-11-21

## 2017-11-21 LAB
GLUCOSE U: NEGATIVE
PROTEIN: NEGATIVE

## 2017-11-22 ENCOUNTER — TELEPHONE (OUTPATIENT)
Dept: OBGYN CLINIC | Facility: CLINIC | Age: 37
End: 2017-11-22

## 2017-11-29 ENCOUNTER — CHARTING TRANS (OUTPATIENT)
Dept: OTHER | Age: 37
End: 2017-11-29

## 2017-11-29 ENCOUNTER — HOSPITAL (OUTPATIENT)
Dept: OTHER | Age: 37
End: 2017-11-29
Attending: OBSTETRICS & GYNECOLOGY

## 2017-11-29 LAB
AMORPH SED URNS QL MICRO: NORMAL
AMPHETAMINES UR QL SCN>500 NG/ML: NEGATIVE
ANALYZER ANC (IANC): ABNORMAL
APPEARANCE UR: CLEAR
APTT PPP: 26 SECONDS (ref 22–30)
APTT PPP: NORMAL S
BARBITURATES UR QL SCN>200 NG/ML: NEGATIVE
BASOPHILS # BLD: 0 THOUSAND/MCL (ref 0–0.3)
BASOPHILS NFR BLD: 0 %
BENZODIAZ UR QL SCN>200 NG/ML: NEGATIVE
BILIRUB UR QL: NEGATIVE
BZE UR QL SCN>150 NG/ML: NEGATIVE
CANNABINOIDS UR QL SCN>50 NG/ML: NEGATIVE
CAOX CRY URNS QL MICRO: NORMAL
COLOR UR: YELLOW
CRP SERPL-MCNC: 1.8 MG/DL
D DIMER PPP FEU-MCNC: 1.31 MG/L FEU
DIFFERENTIAL METHOD BLD: ABNORMAL
EOSINOPHIL # BLD: 0.3 THOUSAND/MCL (ref 0.1–0.5)
EOSINOPHIL NFR BLD: 1 %
EPITH CASTS #/AREA URNS LPF: NORMAL /[LPF]
ERYTHROCYTE [DISTWIDTH] IN BLOOD: 14.2 % (ref 11–15)
FATTY CASTS #/AREA URNS LPF: NORMAL /[LPF]
FIBRINOGEN RESULT: 586 MG/DL (ref 190–425)
GLUCOSE UR-MCNC: NEGATIVE MG/DL
GRAN CASTS #/AREA URNS LPF: NORMAL /[LPF]
HEMATOCRIT: 39.2 % (ref 36–46.5)
HGB BLD-MCNC: 13.5 GM/DL (ref 12–15.5)
HGB UR QL: NEGATIVE
HYALINE CASTS #/AREA URNS LPF: NORMAL /[LPF]
INR PPP: 0.9
KETONES UR-MCNC: NEGATIVE MG/DL
LEUKOCYTE ESTERASE UR QL STRIP: NEGATIVE
LYMPHOCYTES # BLD: 2.6 THOUSAND/MCL (ref 1–4.8)
LYMPHOCYTES NFR BLD: 14 %
MCH RBC QN AUTO: 29.8 PG (ref 26–34)
MCHC RBC AUTO-ENTMCNC: 34.4 GM/DL (ref 32–36.5)
MCV RBC AUTO: 86.5 FL (ref 78–100)
METHADONE UR QL SCN>300 NG/ML: NEGATIVE NG/ML
MICROSCOPIC (MT): NORMAL
MIXED CELL CASTS #/AREA URNS LPF: NORMAL /[LPF]
MONOCYTES # BLD: 1.5 THOUSAND/MCL (ref 0.3–0.9)
MONOCYTES NFR BLD: 8 %
MUCOUS THREADS URNS QL MICRO: NORMAL
NEUTROPHILS # BLD: 14.6 THOUSAND/MCL (ref 1.8–7.7)
NEUTROPHILS NFR BLD: 77 %
NEUTS SEG NFR BLD: ABNORMAL %
NITRITE UR QL: NEGATIVE
OPIATES UR QL SCN>300 NG/ML: NEGATIVE
PCP UR QL SCN>25 NG/ML: NEGATIVE
PERCENT NRBC: ABNORMAL
PH UR: 7 UNIT (ref 5–7)
PLATELET # BLD: 265 THOUSAND/MCL (ref 140–450)
PROT UR QL: NEGATIVE MG/DL
PROTHROMBIN TIME: 9.7 SECONDS (ref 9.7–11.8)
PROTHROMBIN TIME: NORMAL
RBC # BLD: 4.53 MILLION/MCL (ref 4–5.2)
RBC CASTS #/AREA URNS LPF: NORMAL /[LPF]
RENAL EPI CELLS #/AREA URNS HPF: NORMAL /[HPF]
SP GR UR: 1.01 (ref 1–1.03)
SPECIMEN SOURCE: NORMAL
SPERM URNS QL MICRO: NORMAL
T VAGINALIS URNS QL MICRO: NORMAL
TRI-PHOS CRY URNS QL MICRO: NORMAL
URATE CRY URNS QL MICRO: NORMAL
URNS CMNT MICRO: NORMAL
UROBILINOGEN UR QL: 0.2 MG/DL (ref 0–1)
WAXY CASTS #/AREA URNS LPF: NORMAL /[LPF]
WBC # BLD: 18.9 THOUSAND/MCL (ref 4.2–11)
WBC CASTS #/AREA URNS LPF: NORMAL /[LPF]
YEAST HYPHAE URNS QL MICRO: NORMAL
YEAST URNS QL MICRO: NORMAL

## 2017-11-30 LAB
ANALYZER ANC (IANC): ABNORMAL
BASOPHILS # BLD: 0 THOUSAND/MCL (ref 0–0.3)
BASOPHILS NFR BLD: 0 %
DIFFERENTIAL METHOD BLD: ABNORMAL
EOSINOPHIL # BLD: 0 THOUSAND/MCL (ref 0.1–0.5)
EOSINOPHIL NFR BLD: 0 %
ERYTHROCYTE [DISTWIDTH] IN BLOOD: 14 % (ref 11–15)
HEMATOCRIT: 30.1 % (ref 36–46.5)
HGB BLD-MCNC: 10.4 GM/DL (ref 12–15.5)
LYMPHOCYTES # BLD: 1.5 THOUSAND/MCL (ref 1–4.8)
LYMPHOCYTES NFR BLD: 7 %
MCH RBC QN AUTO: 29.8 PG (ref 26–34)
MCHC RBC AUTO-ENTMCNC: 34.6 GM/DL (ref 32–36.5)
MCV RBC AUTO: 86.2 FL (ref 78–100)
MONOCYTES # BLD: 1.7 THOUSAND/MCL (ref 0.3–0.9)
MONOCYTES NFR BLD: 8 %
NEUTROPHILS # BLD: 18.3 THOUSAND/MCL (ref 1.8–7.7)
NEUTROPHILS NFR BLD: 85 %
NEUTS SEG NFR BLD: ABNORMAL %
PERCENT NRBC: ABNORMAL
PLATELET # BLD: 213 THOUSAND/MCL (ref 140–450)
RBC # BLD: 3.49 MILLION/MCL (ref 4–5.2)
WBC # BLD: 21.6 THOUSAND/MCL (ref 4.2–11)

## 2018-01-15 ENCOUNTER — CHARTING TRANS (OUTPATIENT)
Dept: OTHER | Age: 38
End: 2018-01-15

## 2018-02-12 ENCOUNTER — TELEPHONE (OUTPATIENT)
Dept: PEDIATRICS CLINIC | Facility: CLINIC | Age: 38
End: 2018-02-12

## 2018-02-12 NOTE — TELEPHONE ENCOUNTER
Pt had ER delivery 11/30/17 [facility] wants to schedule post delivery appt.  Has been having irregular bleeding and abdominal cramping.,

## 2018-02-12 NOTE — TELEPHONE ENCOUNTER
Pt states she delivered via  at [facility] on 17 (per my calculations she was just about 24 weeks). Pt did not offer any information on the outcome of delivery. Pt states she wants to see [facility] for a \"check up\".  Pt states she saw [facility] for a \"talk\" afte

## 2018-02-13 VITALS — BODY MASS INDEX: 24.43 KG/M2 | WEIGHT: 155.63 LBS | HEIGHT: 67 IN

## 2018-02-13 PROBLEM — O09.519 AMA (ADVANCED MATERNAL AGE) PRIMIGRAVIDA 35+: Status: RESOLVED | Noted: 2017-09-07 | Resolved: 2018-02-13

## 2018-02-13 PROBLEM — O42.912 PREMATURE RUPTURE OF MEMBRANES IN SECOND TRIMESTER: Status: RESOLVED | Noted: 2017-10-14 | Resolved: 2018-02-13

## 2018-02-13 PROBLEM — Q95.1: Status: RESOLVED | Noted: 2017-09-07 | Resolved: 2018-02-13

## 2018-02-15 ENCOUNTER — TELEPHONE (OUTPATIENT)
Dept: OBGYN CLINIC | Facility: CLINIC | Age: 38
End: 2018-02-15

## 2018-02-15 ENCOUNTER — LAB ENCOUNTER (OUTPATIENT)
Dept: LAB | Facility: HOSPITAL | Age: 38
End: 2018-02-15
Attending: OBSTETRICS & GYNECOLOGY
Payer: COMMERCIAL

## 2018-02-15 ENCOUNTER — OFFICE VISIT (OUTPATIENT)
Dept: OBGYN CLINIC | Facility: CLINIC | Age: 38
End: 2018-02-15

## 2018-02-15 VITALS
HEART RATE: 60 BPM | DIASTOLIC BLOOD PRESSURE: 74 MMHG | WEIGHT: 166.19 LBS | BODY MASS INDEX: 26 KG/M2 | SYSTOLIC BLOOD PRESSURE: 122 MMHG

## 2018-02-15 DIAGNOSIS — R10.2 CHRONIC PELVIC PAIN IN FEMALE: ICD-10-CM

## 2018-02-15 DIAGNOSIS — G89.29 CHRONIC PELVIC PAIN IN FEMALE: Primary | ICD-10-CM

## 2018-02-15 DIAGNOSIS — G89.29 CHRONIC PELVIC PAIN IN FEMALE: ICD-10-CM

## 2018-02-15 DIAGNOSIS — R10.2 CHRONIC PELVIC PAIN IN FEMALE: Primary | ICD-10-CM

## 2018-02-15 LAB
B-HCG UR QL: NEGATIVE
BASOPHILS # BLD: 0 K/UL (ref 0–0.2)
BASOPHILS NFR BLD: 0 %
BILIRUB UR QL: NEGATIVE
CLARITY UR: CLEAR
COLOR UR: YELLOW
EOSINOPHIL # BLD: 0.5 K/UL (ref 0–0.7)
EOSINOPHIL NFR BLD: 5 %
ERYTHROCYTE [DISTWIDTH] IN BLOOD BY AUTOMATED COUNT: 13.9 % (ref 11–15)
GLUCOSE UR-MCNC: NEGATIVE MG/DL
HCT VFR BLD AUTO: 41.9 % (ref 35–48)
HGB BLD-MCNC: 13.8 G/DL (ref 12–16)
HGB UR QL STRIP.AUTO: NEGATIVE
KETONES UR-MCNC: NEGATIVE MG/DL
LYMPHOCYTES # BLD: 3.9 K/UL (ref 1–4)
LYMPHOCYTES NFR BLD: 38 %
MCH RBC QN AUTO: 28.5 PG (ref 27–32)
MCHC RBC AUTO-ENTMCNC: 32.8 G/DL (ref 32–37)
MCV RBC AUTO: 86.7 FL (ref 80–100)
MONOCYTES # BLD: 0.7 K/UL (ref 0–1)
MONOCYTES NFR BLD: 6 %
NEUTROPHILS # BLD AUTO: 5.3 K/UL (ref 1.8–7.7)
NEUTROPHILS NFR BLD: 51 %
NITRITE UR QL STRIP.AUTO: NEGATIVE
PH UR: 6 [PH] (ref 5–8)
PLATELET # BLD AUTO: 249 K/UL (ref 140–400)
PMV BLD AUTO: 9.7 FL (ref 7.4–10.3)
PROT UR-MCNC: NEGATIVE MG/DL
RBC # BLD AUTO: 4.84 M/UL (ref 3.7–5.4)
RBC #/AREA URNS AUTO: <1 /HPF
SP GR UR STRIP: 1.02 (ref 1–1.03)
UROBILINOGEN UR STRIP-ACNC: <2
VIT C UR-MCNC: 40 MG/DL
WBC # BLD AUTO: 10.4 K/UL (ref 4–11)
WBC #/AREA URNS AUTO: 3 /HPF

## 2018-02-15 PROCEDURE — 85025 COMPLETE CBC W/AUTO DIFF WBC: CPT

## 2018-02-15 PROCEDURE — 81025 URINE PREGNANCY TEST: CPT

## 2018-02-15 PROCEDURE — 81001 URINALYSIS AUTO W/SCOPE: CPT

## 2018-02-15 PROCEDURE — 36415 COLL VENOUS BLD VENIPUNCTURE: CPT

## 2018-02-15 PROCEDURE — 99214 OFFICE O/P EST MOD 30 MIN: CPT | Performed by: OBSTETRICS & GYNECOLOGY

## 2018-02-15 RX ORDER — ASCORBIC ACID 500 MG
500 TABLET ORAL DAILY
COMMUNITY

## 2018-02-15 RX ORDER — DOXYCYCLINE HYCLATE 100 MG
100 TABLET ORAL 2 TIMES DAILY
Qty: 14 TABLET | Refills: 0 | Status: SHIPPED | OUTPATIENT
Start: 2018-02-15 | End: 2018-02-22

## 2018-02-15 NOTE — TELEPHONE ENCOUNTER
Please call patient and let her know I cant order ferritin for her cause she was her for a problem visit and it wont be covered under her insurance.

## 2018-02-15 NOTE — TELEPHONE ENCOUNTER
PT CAME BACK TO OFFICE FROM [facility] LAB AND SHE IS REQUESTING ORDERS FOR FERRITIN ( AND IRON LEVELS) TO BE ORDERED SO SHE CAN HAVE DRAWN.

## 2018-02-15 NOTE — TELEPHONE ENCOUNTER
Pt already had blood drawn. PSR advised to tell pt we will ak MARCOS if we will add order but they may not be able to process from specimen as different labs require different tubes. PSR told pt we will call with instructions.

## 2018-02-16 NOTE — TELEPHONE ENCOUNTER
----- Message from Mikki Laguna DO sent at 2/15/2018  7:01 PM CST -----  CBC normal.  hgb good and mcv shows normal signs of iron levels. UA not overly concerning for UTI.   I think she has endometritis as a result of the bag of water broken and her

## 2018-02-16 NOTE — TELEPHONE ENCOUNTER
Pt informed of results and verbalizes understanding. Pt informed ferritin can not be added to labs. Pt states that she forgot to mention that she has been having headaches and lower back pain.  Pt also was surprised that she did not have a breast exam. Pt a

## 2018-02-16 NOTE — TELEPHONE ENCOUNTER
This wasn't her annual exam.  This was a problem focused visit for pelvic pain. The back pain if it is lower in the abdomen could be related to her suspected endometritis. The HAs she will need to see her PCP for.

## 2018-02-17 LAB
GENITAL VAGINOSIS SCREEN: NEGATIVE
TRICHOMONAS SCREEN: NEGATIVE

## 2018-02-19 ENCOUNTER — TELEPHONE (OUTPATIENT)
Dept: OBGYN CLINIC | Facility: CLINIC | Age: 38
End: 2018-02-19

## 2018-02-19 LAB
C TRACH DNA SPEC QL NAA+PROBE: NEGATIVE
N GONORRHOEA DNA SPEC QL NAA+PROBE: NEGATIVE

## 2018-02-19 NOTE — TELEPHONE ENCOUNTER
C/O SPOTTING WITH WIPING AGAIN FOR A COUPLE DAYS. ALSO HAS LOW BACK PAIN AND PAIN IN HIPS FOR WEEKS. SAID SHE HAS A HEAVY FEELING IN HER LOW ABDOMEN. PAIN WAS WORSE YESTERDAY, RATING IT 5-6/10. USED IBUPROFEN TWICE YESTERDAY FOR MINIMAL RELIEF.   PAIN I

## 2018-02-19 NOTE — H&P
HPI:  The patient is a 66-year-old  who presents complaining of chronic pelvic pain. The patient had  premature rupture of membranes at approximately 16 weeks and decided on expectant management.   She was seeing Dr. Dusty Browning at [facility] children: N/A     Occupational History  None on file     Social History Main Topics   Smoking status: Never Smoker    Smokeless tobacco: Never Used    Alcohol use Yes  0.0 oz/week     Comment: OCC    Drug use: No    Sexual activity: Yes    Partners: Male motion  Uterus: normal in size, contour, position, mobility, without tenderness  Adnexa: normal without masses or tenderness  Perineum: normal    Assessment/Plan:  Mattie Blake was seen today for gyn problem.     Diagnoses and all orders for this visit:    Chronic

## 2018-02-20 ENCOUNTER — TELEPHONE (OUTPATIENT)
Dept: OBGYN CLINIC | Facility: CLINIC | Age: 38
End: 2018-02-20

## 2018-02-20 NOTE — TELEPHONE ENCOUNTER
Pt informed of MAZs recs and verbalized understanding. Pt has multiple questions. Pt asking for the difference between endometritis and endometriosis and what this means long term for her.  Pt wanting to know if this will go away and wants to know how [facility] Dominga Martinez

## 2018-02-20 NOTE — TELEPHONE ENCOUNTER
----- Message from Delvis Robbins DO sent at 2/19/2018  2:58 PM CST -----  Please notify of normal results. Pelvic pain any better after starting doxy?

## 2018-02-20 NOTE — TELEPHONE ENCOUNTER
Pt notified of normal results for chlamydia/GC, gen. Vag, Trich and Brenda. Ask pt if he pelvic pain any better after starting Doxy. Pt started Doxy on 2/15/18. Pt is taking bid x 7 days.   (Pt saw [address] 2/15/18.)     Pt states that she has lower back pain

## 2018-02-20 NOTE — TELEPHONE ENCOUNTER
I'm concerned the patient has endometritis. I saw her on Thursday for this. Has she started the antibiotics? Labs were unrevealing for UTI and infection. She may need to give the doxy time to work.

## 2018-02-20 NOTE — TELEPHONE ENCOUNTER
Endometritis as infection the lining of the uterus. Endometriosis is where the lining is outside of the. These are different conditions. We distinguish the difference based on clinical scenario.   The bleeding ideally will get better with treatment with

## 2018-02-20 NOTE — TELEPHONE ENCOUNTER
PT NOTIFIED OF RECS BELOW. REASSURED PT MULTIPLE TIMES THAT HER SYMPTOMS ARE CHARACTERISTIC OF ENDOMETRITIS NOT ENDOMETRIOSIS. PT WAS HOPING FOR A STRONGER PAIN PRESCRIPTION SO ADVISED IF PAIN REQUIRES MORE THAN MOTRIN TO GO TO THE ER.   PT VERBALIZED UND

## 2018-03-14 ENCOUNTER — TELEPHONE (OUTPATIENT)
Dept: OBGYN CLINIC | Facility: CLINIC | Age: 38
End: 2018-03-14

## 2018-03-14 NOTE — TELEPHONE ENCOUNTER
CALLED PT AND SHE HAS AN APPT WITH M[facility] 3/16/18 TO DISCUSS HER IRREGULAR BLEEDING. SHE STATES SHE IS NOT ON BIRTH CONTROL. SHE HAS A PAIN  IN HER LOWER BACK.   SHE RATES THE PAIN 6/10.  (INFORMED PT IF THE PAIN GETS A 7-10 BEFORE SHE SEES MARCOS TO GO TO THE

## 2018-03-16 ENCOUNTER — OFFICE VISIT (OUTPATIENT)
Dept: OBGYN CLINIC | Facility: CLINIC | Age: 38
End: 2018-03-16

## 2018-03-16 VITALS
DIASTOLIC BLOOD PRESSURE: 73 MMHG | HEART RATE: 66 BPM | BODY MASS INDEX: 27 KG/M2 | WEIGHT: 171 LBS | SYSTOLIC BLOOD PRESSURE: 107 MMHG

## 2018-03-16 DIAGNOSIS — R10.2 ACUTE PELVIC PAIN, FEMALE: Primary | ICD-10-CM

## 2018-03-16 DIAGNOSIS — N93.9 VAGINAL BLEEDING: ICD-10-CM

## 2018-03-16 PROCEDURE — 99214 OFFICE O/P EST MOD 30 MIN: CPT | Performed by: OBSTETRICS & GYNECOLOGY

## 2018-03-21 ENCOUNTER — TELEPHONE (OUTPATIENT)
Dept: OBGYN CLINIC | Facility: CLINIC | Age: 38
End: 2018-03-21

## 2018-03-21 ENCOUNTER — HOSPITAL ENCOUNTER (OUTPATIENT)
Dept: ULTRASOUND IMAGING | Facility: HOSPITAL | Age: 38
Discharge: HOME | End: 2018-03-21
Attending: OBSTETRICS & GYNECOLOGY
Payer: COMMERCIAL

## 2018-03-21 DIAGNOSIS — N93.9 VAGINAL BLEEDING: ICD-10-CM

## 2018-03-21 DIAGNOSIS — R10.2 ACUTE PELVIC PAIN, FEMALE: ICD-10-CM

## 2018-03-21 PROCEDURE — 76856 US EXAM PELVIC COMPLETE: CPT | Performed by: OBSTETRICS & GYNECOLOGY

## 2018-03-21 PROCEDURE — 93975 VASCULAR STUDY: CPT | Performed by: OBSTETRICS & GYNECOLOGY

## 2018-03-21 PROCEDURE — 76830 TRANSVAGINAL US NON-OB: CPT | Performed by: OBSTETRICS & GYNECOLOGY

## 2018-03-21 NOTE — TELEPHONE ENCOUNTER
Pt states that she has bleeding and the last few days she has been changing a pad every 1 1/2 - 2 hours. Today she changed her pad twice. Pt states yesterday she passed a clot the size of a strawberry. Pt states that she has sob, dizziness and headache.

## 2018-03-22 ENCOUNTER — TELEPHONE (OUTPATIENT)
Dept: OBGYN CLINIC | Facility: CLINIC | Age: 38
End: 2018-03-22

## 2018-03-22 DIAGNOSIS — R53.83 FATIGUE, UNSPECIFIED TYPE: Primary | ICD-10-CM

## 2018-03-22 RX ORDER — MEDROXYPROGESTERONE ACETATE 10 MG/1
10 TABLET ORAL DAILY
Qty: 10 TABLET | Refills: 0 | Status: SHIPPED | OUTPATIENT
Start: 2018-03-22

## 2018-03-22 NOTE — TELEPHONE ENCOUNTER
Pt does not want to speak to the nurse and would like to speak to [address] [facility] [address] [address] states its almost 5 pm and hasn't received call about results pt is upset.  Please advise

## 2018-03-22 NOTE — TELEPHONE ENCOUNTER
Pt waiting for US results since yesterday. Pt advised that we did not call with results because MARCOS has not received results yet. Pt asking \"how long it will take\" and that it is \"almost 3:00 in the afternoon\".  Pt states that she is \"in pain and bleed

## 2018-03-23 NOTE — TELEPHONE ENCOUNTER
Called and spoke with patient. She has continued to have pain since 3/14 when her bleeding and pelvic pain began. At our visit, PE was wnl. Given she has continued to have irregular bleeding and low back pain, decision for pelvic US.   She had been previ this may not be of a gynecologic source. The patient had pain in February and it was suspected that she had endometritis at the time. I gave her a week of doxycycline and her pain resolved.   She states the pain that she is having now is different in sens however the patient continues to refuse. If her bleeding gets better and her pain persists I discussed that the patient may want to see her PCP to rule out any urologic or colonic sources of pain. The patient states she will consider.   I spent over 10 mi

## 2018-03-29 NOTE — H&P
HPI:  The patient is a 59-year-old female who presents to discuss pelvic and low back pain. The patient states that the pain started 2 days ago is mainly in her lower back as well as in her bilateral pelvis. No inciting factors.   She states that she st Comment: rhinoplasty  2012: REPAIR OF NASAL SEPTUM    SOCIAL HISTORY:    Social History  Social History   Marital status:   Spouse name: N/A    Years of education: N/A  Number of children: N/A     Occupational History  None on file     Social Histor tenderness  Vagina:  Normal appearance without lesions, no abnormal discharge; scant blood in vault  Cervix:  Normal without tenderness on motion  Uterus: normal in size, contour, position, mobility, without tenderness  Adnexa: normal without masses or ten

## 2018-04-03 ENCOUNTER — TELEPHONE (OUTPATIENT)
Dept: OBGYN CLINIC | Facility: CLINIC | Age: 38
End: 2018-04-03

## 2018-04-03 NOTE — TELEPHONE ENCOUNTER
LENORE from [facility] requesting medical records placed on LENORE clipboard for processing.

## 2018-04-10 ENCOUNTER — TELEPHONE (OUTPATIENT)
Dept: OBGYN CLINIC | Facility: CLINIC | Age: 38
End: 2018-04-10

## 2018-04-10 NOTE — TELEPHONE ENCOUNTER
Pt states she \"wants to go over her US results and she is also still bleeding and has pain\". Pt asking if MARCOS thinks there is any tissue left in her uterus and does she maybe need another D&C? Informed pt that MARCOS's note states US was essentially wnl.  As

## 2018-04-10 NOTE — TELEPHONE ENCOUNTER
Pt states still bleeding and in a pain. Requesting results. Pt requesting to speak with [address] [place] [address] and not nurse. pls adv.

## 2018-04-11 ENCOUNTER — TELEPHONE (OUTPATIENT)
Dept: OBGYN CLINIC | Facility: CLINIC | Age: 38
End: 2018-04-11

## 2018-04-11 NOTE — TELEPHONE ENCOUNTER
Manjit Dong stated that he spoke with Dr. Jonathon Patel from [place]. MARCOS stated that Dr. Jonathon Patel needs access to pts chart to review records.  [facility] has Care everywhere and pt needs to be advised to [facility] order Dr. Jonathon Ptael to have ac

## 2018-04-11 NOTE — TELEPHONE ENCOUNTER
Dr Faisal Juan is a ob/gyne doctor that saw yesterday, and she would like to speak with Dr Anaya Osborne. Please advise.

## 2018-04-11 NOTE — TELEPHONE ENCOUNTER
Informed pt stated  MARCOS stated that he spoke with Dr. Chase Hopkins from [place]. MARCOS stated that Dr. Chase Hopkins needs access to pts chart to review records.  [facility] has Care everywhere and pt needs to be advised to sign an LENORE with [facility] in order Dr. Brittany Acharya

## 2018-04-12 NOTE — TELEPHONE ENCOUNTER
Spoke to Dr. Carmenza Dubon yesterday regarding patient. Asked our RNs to coordinate access to our records for Dr. Carmenza Dubon to review. LMTCB to discuss Chelsea's questions.

## 2018-04-16 NOTE — TELEPHONE ENCOUNTER
Spoke to patient last week. Wants my recs/thoughts. Saw Dr. Dayne Sung at [place], whom I spoke to and discuss our US findings. Our US was wnl.  ?mass at fundus at [facility].   I told Luis Gaona if there is concern for retained products vs polyp this could con

## 2018-11-02 VITALS
SYSTOLIC BLOOD PRESSURE: 95 MMHG | WEIGHT: 156 LBS | BODY MASS INDEX: 24.48 KG/M2 | HEIGHT: 67 IN | DIASTOLIC BLOOD PRESSURE: 58 MMHG

## 2018-11-02 VITALS — WEIGHT: 152 LBS | SYSTOLIC BLOOD PRESSURE: 98 MMHG | TEMPERATURE: 98.6 F | DIASTOLIC BLOOD PRESSURE: 60 MMHG

## 2018-11-02 VITALS — WEIGHT: 151 LBS | DIASTOLIC BLOOD PRESSURE: 64 MMHG | SYSTOLIC BLOOD PRESSURE: 118 MMHG

## 2018-11-02 VITALS — SYSTOLIC BLOOD PRESSURE: 100 MMHG | DIASTOLIC BLOOD PRESSURE: 70 MMHG | WEIGHT: 150 LBS

## 2018-11-02 VITALS
SYSTOLIC BLOOD PRESSURE: 106 MMHG | WEIGHT: 149 LBS | BODY MASS INDEX: 23.39 KG/M2 | HEIGHT: 67 IN | DIASTOLIC BLOOD PRESSURE: 60 MMHG

## 2018-11-02 VITALS — WEIGHT: 157 LBS | SYSTOLIC BLOOD PRESSURE: 98 MMHG | DIASTOLIC BLOOD PRESSURE: 60 MMHG

## 2018-11-02 VITALS — TEMPERATURE: 98.8 F | SYSTOLIC BLOOD PRESSURE: 92 MMHG | WEIGHT: 155 LBS | DIASTOLIC BLOOD PRESSURE: 64 MMHG

## 2018-11-02 VITALS — DIASTOLIC BLOOD PRESSURE: 60 MMHG | SYSTOLIC BLOOD PRESSURE: 100 MMHG | WEIGHT: 152 LBS

## 2018-11-02 VITALS — DIASTOLIC BLOOD PRESSURE: 64 MMHG | SYSTOLIC BLOOD PRESSURE: 108 MMHG | WEIGHT: 146.9 LBS

## 2022-10-13 NOTE — TELEPHONE ENCOUNTER
Per the pt she has been having pelvic pain for about 3 days, and is would like to speak with a nurse. Please advise.
Pt calling to report mild pelvic pain that started about 3 days ago. Pt states her period ended about 1 week ago \"so it's not period cramps\". Pt also reports abdominal bloating.  Pt has been treating pain with Motrin and a heating pad and it's been helpin
Yes

## (undated) NOTE — MR AVS SNAPSHOT
[facility]  [address] [facility], [address]   [phone]               Thank you for choosing us for your health care visit with Tony Diaz. DO Lidia.   We are glad to serve you and happy to provide you with this summary o If you have questions, you can call [phone] to talk to our [facility] Staff. Remember, [facility] is NOT to be used for urgent needs. For medical emergencies, dial 911. Visit [email or web address] to learn more.            Visit [email or web address]